# Patient Record
Sex: FEMALE | Race: WHITE | NOT HISPANIC OR LATINO | URBAN - METROPOLITAN AREA
[De-identification: names, ages, dates, MRNs, and addresses within clinical notes are randomized per-mention and may not be internally consistent; named-entity substitution may affect disease eponyms.]

---

## 2017-06-16 ENCOUNTER — APPOINTMENT (RX ONLY)
Dept: URBAN - METROPOLITAN AREA SURGERY 2 | Facility: SURGERY | Age: 67
Setting detail: DERMATOLOGY
End: 2017-06-16

## 2017-06-16 DIAGNOSIS — Z41.1 ENCOUNTER FOR COSMETIC SURGERY: ICD-10-CM

## 2017-06-16 PROCEDURE — ? CONSULTATION - ABDOMINOPLASTY

## 2017-06-16 PROCEDURE — ? CONSULTATION - AESTHETIC FACIAL DEFORMITY

## 2017-06-16 PROCEDURE — ? PATIENT SPECIFIC COUNSELING

## 2017-06-16 NOTE — PROCEDURE: PATIENT SPECIFIC COUNSELING
Other (Free Text): Discussed with patient that she would benefit from Thermi.\\nExplained to patient that the Thermi procedure can be done under local anesthesia and pill sedation in the office.\\nDiscussed with patient that she does not need another facelift. Explained to patient if she wanted to do the facelift again, it would be done in the OR under general anesthesia.
Other (Free Text): After examination of patients abdomen there is no evidence of a hernia. \\nExplained to patient that her previous tummy tuck scar will be used.\\nDiscussed with patient that she will have a faster recovery, because there will be no stitching of the muscle.\\nDiscussed with patient that her procedure will be done in the OR under general anesthesia.
Detail Level: Zone

## 2017-06-16 NOTE — PROCEDURE: CONSULTATION - ABDOMINOPLASTY
Detail Level: Simple
Send Procedure Quote As Charge: No
Consultation Charge $ (Use Numbers Only, No Text Please.): 100

## 2017-07-17 ENCOUNTER — APPOINTMENT (RX ONLY)
Dept: URBAN - METROPOLITAN AREA SURGERY 2 | Facility: SURGERY | Age: 67
Setting detail: DERMATOLOGY
End: 2017-07-17

## 2017-07-17 DIAGNOSIS — Z41.9 ENCOUNTER FOR PROCEDURE FOR PURPOSES OTHER THAN REMEDYING HEALTH STATE, UNSPECIFIED: ICD-10-CM

## 2017-07-17 PROCEDURE — ? FILLERS

## 2017-07-17 NOTE — PROCEDURE: FILLERS
[place] Hollows Filler Volume In Cc: 0
Expiration Date (Month Year): 09/2018
Price $ (Numeric Only, No Text Please.): 451
Consent: Written consent obtained. Risks include but not limited to bruising, beading, irregular texture, ulceration, infection, allergic reaction, scar formation, incomplete augmentation, temporary nature, procedural pain.
Additional Area 1 Location: Perioral
Map Statment: See attached map for complete details
Additional Area 3 Location: Oral commissures
Show Price In Note?: yes
Cheeks Filler Volume In Cc: 0.2
Additional Area 2 Location: Glabellar
Nasolabial Folds Filler Volume In Cc: 0.1
Topical Anesthetic Base: ointment
Additional Area 3 Location: Ear lobe
Additional Area 2 Location: ear lobes
Lot #: [id number]
Filler: Juvederm Ultra XC
Use Map Statement For Sites (Optional): No
Detail Level: Simple
Administered By (Optional): Dianelys Pruitt
Additional Area 4 Location: nasal root

## 2017-08-15 ENCOUNTER — APPOINTMENT (RX ONLY)
Dept: URBAN - METROPOLITAN AREA SURGERY 2 | Facility: SURGERY | Age: 67
Setting detail: DERMATOLOGY
End: 2017-08-15

## 2017-08-15 DIAGNOSIS — Z41.1 ENCOUNTER FOR COSMETIC SURGERY: ICD-10-CM

## 2017-08-15 PROCEDURE — ? CONSULTATION - ABDOMINOPLASTY

## 2017-08-15 PROCEDURE — ? PRE-OP WORKLIST

## 2017-08-15 ASSESSMENT — LOCATION DETAILED DESCRIPTION DERM
LOCATION DETAILED: PERIUMBILICAL SKIN
LOCATION DETAILED: PERIUMBILICAL SKIN

## 2017-08-15 ASSESSMENT — LOCATION SIMPLE DESCRIPTION DERM
LOCATION SIMPLE: ABDOMEN
LOCATION SIMPLE: ABDOMEN

## 2017-08-15 ASSESSMENT — LOCATION ZONE DERM
LOCATION ZONE: TRUNK
LOCATION ZONE: TRUNK

## 2017-08-15 NOTE — PROCEDURE: PRE-OP WORKLIST
Date Of Surgery: 8/31/2017
Surgeon: Juan Dutton MD
Detail Level: Simple
Surgery Scheduled: Secondary tummy tuck

## 2017-09-13 ENCOUNTER — APPOINTMENT (RX ONLY)
Dept: URBAN - METROPOLITAN AREA SURGERY 2 | Facility: SURGERY | Age: 67
Setting detail: DERMATOLOGY
End: 2017-09-13

## 2017-09-13 DIAGNOSIS — Z41.9 ENCOUNTER FOR PROCEDURE FOR PURPOSES OTHER THAN REMEDYING HEALTH STATE, UNSPECIFIED: ICD-10-CM

## 2017-09-13 PROCEDURE — ? FILLERS

## 2017-09-13 NOTE — PROCEDURE: FILLERS
[place] Lines Filler Volume In Cc: 0.2
Upper Lip Filler Volume In Cc: 0
Price $ (Numeric Only, No Text Please.): 375
Filler: Juvederm Volbella XC
Expiration Date (Month Year): 01/2019
Lot #: residual
Lot #: [id number]
Additional Area 1 Volume In Cc: 0.1
Lot #: [id number]
Additional Area 1 Location: Perioral
Map Statment: See attached map for complete details
Administered By (Optional): Dianelys Pruitt
Expiration Date (Month Year): 05/2018
Additional Area 2 Location: Glabellar
Filler: Juvederm Ultra XC
Additional Area 1 Location: Oral commissures
Topical Anesthetic Base: ointment
Detail Level: Simple
Additional Area 4 Location: chin
Additional Area 3 Location: Ear lobe
Additional Area 1 Location: smile striations
Topical Anesthesia?: yes
Additional Area 4 Location: forehead
Consent: Written consent obtained. Risks include but not limited to bruising, beading, irregular texture, ulceration, infection, allergic reaction, scar formation, incomplete augmentation, temporary nature, procedural pain.
Use Map Statement For Sites (Optional): No

## 2017-10-03 ENCOUNTER — APPOINTMENT (RX ONLY)
Dept: URBAN - METROPOLITAN AREA SURGERY 2 | Facility: SURGERY | Age: 67
Setting detail: DERMATOLOGY
End: 2017-10-03

## 2017-10-03 DIAGNOSIS — Z48.89 ENCOUNTER FOR OTHER SPECIFIED SURGICAL AFTERCARE: ICD-10-CM

## 2017-10-03 PROCEDURE — ? PATIENT SPECIFIC COUNSELING

## 2017-10-03 PROCEDURE — ? POST-OP CHECK

## 2017-10-03 PROCEDURE — 99024 POSTOP FOLLOW-UP VISIT: CPT

## 2017-10-03 PROCEDURE — ? COUNSELING - POST-OP CHECK, ABDOMINOPLASTY

## 2017-10-03 ASSESSMENT — LOCATION SIMPLE DESCRIPTION DERM: LOCATION SIMPLE: ABDOMEN

## 2017-10-03 ASSESSMENT — LOCATION DETAILED DESCRIPTION DERM: LOCATION DETAILED: PERIUMBILICAL SKIN

## 2017-10-03 ASSESSMENT — LOCATION ZONE DERM: LOCATION ZONE: TRUNK

## 2017-10-03 NOTE — PROCEDURE: PATIENT SPECIFIC COUNSELING
Detail Level: Simple
Other (Free Text): Informed patient that her incisions are healing very well. Patients belly button looks great.\\nAssured patient that the swelling is normal and will go down overtime.\\nExplained to patient that her drains are not ready to come out as of yet. Discussed with patient that she will return to clinic Friday for them to be removed.\\nPatient can shower as normally, but should continue to take it easy.

## 2017-10-06 ENCOUNTER — APPOINTMENT (RX ONLY)
Dept: URBAN - METROPOLITAN AREA SURGERY 2 | Facility: SURGERY | Age: 67
Setting detail: DERMATOLOGY
End: 2017-10-06

## 2017-10-06 DIAGNOSIS — Z48.89 ENCOUNTER FOR OTHER SPECIFIED SURGICAL AFTERCARE: ICD-10-CM

## 2017-10-06 PROCEDURE — ? COUNSELING - POST-OP CHECK

## 2017-10-06 PROCEDURE — ? PATIENT SPECIFIC COUNSELING

## 2017-10-06 PROCEDURE — 99024 POSTOP FOLLOW-UP VISIT: CPT

## 2017-10-06 PROCEDURE — ? POST-OP CHECK

## 2017-10-06 ASSESSMENT — LOCATION DETAILED DESCRIPTION DERM: LOCATION DETAILED: PERIUMBILICAL SKIN

## 2017-10-06 ASSESSMENT — LOCATION ZONE DERM: LOCATION ZONE: TRUNK

## 2017-10-06 ASSESSMENT — LOCATION SIMPLE DESCRIPTION DERM: LOCATION SIMPLE: ABDOMEN

## 2017-10-06 NOTE — PROCEDURE: POST-OP CHECK
Wound Evaluated By: Dr. Dutton.
Detail Level: Detailed
Add Postop Global No-Charge Code (40112)?: yes

## 2017-10-06 NOTE — PROCEDURE: PATIENT SPECIFIC COUNSELING
Detail Level: Zone
Other (Free Text): Patient was advised that her incision is healing well. Patients drains were removed today. Patient was advised to continue taking it easy , and wearing her belly bi dear to help reduce the swelling.